# Patient Record
Sex: FEMALE | Race: WHITE | NOT HISPANIC OR LATINO | Employment: FULL TIME | URBAN - METROPOLITAN AREA
[De-identification: names, ages, dates, MRNs, and addresses within clinical notes are randomized per-mention and may not be internally consistent; named-entity substitution may affect disease eponyms.]

---

## 2020-07-30 ENCOUNTER — TELEPHONE (OUTPATIENT)
Dept: FAMILY MEDICINE CLINIC | Facility: CLINIC | Age: 25
End: 2020-07-30

## 2020-07-30 DIAGNOSIS — Z30.41 ENCOUNTER FOR SURVEILLANCE OF CONTRACEPTIVE PILLS: Primary | ICD-10-CM

## 2020-07-30 RX ORDER — NORGESTIMATE AND ETHINYL ESTRADIOL 7DAYSX3 28
1 KIT ORAL DAILY
COMMUNITY
End: 2020-07-30 | Stop reason: SDUPTHER

## 2020-07-30 RX ORDER — NORGESTIMATE AND ETHINYL ESTRADIOL 7DAYSX3 28
1 KIT ORAL DAILY
Qty: 28 TABLET | Refills: 0 | Status: SHIPPED | OUTPATIENT
Start: 2020-07-30 | End: 2020-08-24 | Stop reason: SDUPTHER

## 2020-07-30 NOTE — TELEPHONE ENCOUNTER
Needs a refill for tri-trevsem birth control    Pharmacy - [facility] [address]    Patient ph # [phone]

## 2020-08-24 ENCOUNTER — ANNUAL EXAM (OUTPATIENT)
Dept: OBGYN CLINIC | Facility: CLINIC | Age: 25
End: 2020-08-24
Payer: COMMERCIAL

## 2020-08-24 VITALS
TEMPERATURE: 97.1 F | SYSTOLIC BLOOD PRESSURE: 112 MMHG | WEIGHT: 125 LBS | BODY MASS INDEX: 20.83 KG/M2 | HEIGHT: 65 IN | DIASTOLIC BLOOD PRESSURE: 60 MMHG

## 2020-08-24 DIAGNOSIS — Z30.41 ENCOUNTER FOR SURVEILLANCE OF CONTRACEPTIVE PILLS: ICD-10-CM

## 2020-08-24 DIAGNOSIS — Z11.3 SCREENING EXAMINATION FOR VENEREAL DISEASE: ICD-10-CM

## 2020-08-24 DIAGNOSIS — Z01.419 ROUTINE GYNECOLOGICAL EXAMINATION: Primary | ICD-10-CM

## 2020-08-24 PROCEDURE — S0612 ANNUAL GYNECOLOGICAL EXAMINA: HCPCS | Performed by: OBSTETRICS & GYNECOLOGY

## 2020-08-24 PROCEDURE — G0145 SCR C/V CYTO,THINLAYER,RESCR: HCPCS | Performed by: OBSTETRICS & GYNECOLOGY

## 2020-08-24 PROCEDURE — 87491 CHLMYD TRACH DNA AMP PROBE: CPT | Performed by: OBSTETRICS & GYNECOLOGY

## 2020-08-24 PROCEDURE — 87591 N.GONORRHOEAE DNA AMP PROB: CPT | Performed by: OBSTETRICS & GYNECOLOGY

## 2020-08-24 RX ORDER — FLUTICASONE PROPIONATE 110 UG/1
2 AEROSOL, METERED RESPIRATORY (INHALATION) 2 TIMES DAILY
COMMUNITY

## 2020-08-24 RX ORDER — ALBUTEROL SULFATE 90 UG/1
2 AEROSOL, METERED RESPIRATORY (INHALATION) EVERY 6 HOURS PRN
COMMUNITY

## 2020-08-24 RX ORDER — NORGESTIMATE AND ETHINYL ESTRADIOL 7DAYSX3 28
1 KIT ORAL DAILY
Qty: 84 TABLET | Refills: 3 | Status: SHIPPED | OUTPATIENT
Start: 2020-08-24 | End: 2021-08-21

## 2020-08-24 NOTE — PROGRESS NOTES
Ector Solorzano is a 26 y o  female who presents for annual well woman exam  Periods are regular every 28-30 days, lasting 4 days  No intermenstrual bleeding, spotting, or discharge  Current contraception: OCP (estrogen/progesterone)  History of abnormal Pap smear: no  Family history of uterine or ovarian cancer: no  Regular self breast exam: yes    Family history of breast cancer: no    Menstrual History:  OB History        0    Para   0    Term   0       0    AB   0    Living   0       SAB   0    TAB   0    Ectopic   0    Multiple   0    Live Births   0                  Patient's last menstrual period was 2020 (within days)  The following portions of the patient's history were reviewed and updated as appropriate: allergies, current medications, past family history, past medical history, past social history, past surgical history and problem list     Review of Systems  Review of Systems   Constitutional: Negative for activity change, appetite change, chills, fatigue and fever  Respiratory: Negative for cough and shortness of breath  Cardiovascular: Negative for chest pain, palpitations and leg swelling  Gastrointestinal: Negative for abdominal pain, constipation, diarrhea, nausea and vomiting  Genitourinary: Negative for difficulty urinating, dysuria, flank pain, frequency, hematuria, urgency and vaginal discharge  Neurological: Negative for dizziness and headaches  Psychiatric/Behavioral: Negative for confusion            Objective      /60 (BP Location: Left arm, Patient Position: Sitting, Cuff Size: Adult)   Temp (!) 97 1 °F (36 2 °C) (Tympanic)   Ht 5' 5" (1 651 m)   Wt 56 7 kg (125 lb)   LMP 2020 (Within Days)   BMI 20 80 kg/m²     Physical Exam  OBGyn Exam     General:   alert and oriented, in no acute distress, alert, appears stated age and cooperative   Heart: regular rate and rhythm, S1, S2 normal, no murmur, click, rub or gallop Lungs: clear to auscultation bilaterally   Abdomen: soft, non-tender, without masses or organomegaly   Vulva: normal   Vagina: normal mucosa, normal discharge   Cervix: nabothian cyst, no bleeding following Pap and no cervical motion tenderness   Uterus: normal size   Adnexa:  Breast Exam:  normal adnexa and no mass, fullness, tenderness  breasts appear normal, no suspicious masses, no skin or nipple changes or axillary nodes  Assessment      @well woman@   81-year-old female  Annual exam  Asthma  OCP contraception desire to continue  Plan   Pap/reflux  GC/CT  Diet/exercise  Calcium/vitamin-D  Return to office for annual exam   All questions answered  There are no Patient Instructions on file for this visit

## 2020-08-25 LAB
C TRACH DNA SPEC QL NAA+PROBE: NEGATIVE
N GONORRHOEA DNA SPEC QL NAA+PROBE: NEGATIVE

## 2020-08-28 LAB
LAB AP GYN PRIMARY INTERPRETATION: NORMAL
LAB AP LMP: NORMAL
Lab: NORMAL

## 2021-02-03 ENCOUNTER — CLINICAL SUPPORT (OUTPATIENT)
Dept: FAMILY MEDICINE CLINIC | Facility: CLINIC | Age: 26
End: 2021-02-03
Payer: COMMERCIAL

## 2021-02-03 DIAGNOSIS — Z23 NEED FOR VACCINATION: Primary | ICD-10-CM

## 2021-02-03 PROCEDURE — 86580 TB INTRADERMAL TEST: CPT

## 2021-02-05 LAB
INDURATION: 0 MM
TB SKIN TEST: NEGATIVE

## 2021-03-01 ENCOUNTER — TELEMEDICINE (OUTPATIENT)
Dept: FAMILY MEDICINE CLINIC | Facility: CLINIC | Age: 26
End: 2021-03-01
Payer: COMMERCIAL

## 2021-03-01 VITALS — BODY MASS INDEX: 20.83 KG/M2 | WEIGHT: 125 LBS | HEIGHT: 65 IN

## 2021-03-01 DIAGNOSIS — I88.9 CERVICAL LYMPHADENITIS: Primary | ICD-10-CM

## 2021-03-01 PROCEDURE — 3008F BODY MASS INDEX DOCD: CPT | Performed by: FAMILY MEDICINE

## 2021-03-01 PROCEDURE — 1036F TOBACCO NON-USER: CPT | Performed by: FAMILY MEDICINE

## 2021-03-01 PROCEDURE — 99213 OFFICE O/P EST LOW 20 MIN: CPT | Performed by: FAMILY MEDICINE

## 2021-03-01 PROCEDURE — 3725F SCREEN DEPRESSION PERFORMED: CPT | Performed by: FAMILY MEDICINE

## 2021-03-01 RX ORDER — AMOXICILLIN 875 MG/1
875 TABLET, COATED ORAL 2 TIMES DAILY
Qty: 14 TABLET | Refills: 0 | Status: SHIPPED | OUTPATIENT
Start: 2021-03-01 | End: 2021-03-08

## 2021-03-01 NOTE — PROGRESS NOTES
Virtual Regular Visit      Assessment/Plan:    Problem List Items Addressed This Visit        Immune and Lymphatic    Cervical lymphadenitis - Primary     Pt has had swelling and tenderness below chin since last PM  Pt has also had low grade fever and swelling of b/l lower face  Will start abxs  Pt to call if sxs worsen or persist           Relevant Medications    amoxicillin (AMOXIL) 875 mg tablet               Reason for visit is   Chief Complaint   Patient presents with    sharp pain under chin, swelling in her face    Virtual Regular Visit        Encounter provider Werner Sanchez MD    Provider located at [address] [place] [id number] [phone]      Recent Visits  No visits were found meeting these conditions  Showing recent visits within past 7 days and meeting all other requirements     Today's Visits  Date Type Provider Dept   03/01/21 Telemedicine Werner Sanchez MD  [address] today's visits and meeting all other requirements     Future Appointments  No visits were found meeting these conditions  Showing future appointments within next 150 days and meeting all other requirements        The patient was identified by name and date of birth  Cata Cárdenas was informed that this is a telemedicine visit and that the visit is being conducted through [facility] and patient was informed that this is a secure, HIPAA-compliant platform  She agrees to proceed     My office door was closed  No one else was in the room  She acknowledged consent and understanding of privacy and security of the video platform  The patient has agreed to participate and understands they can discontinue the visit at any time  Patient is aware this is a billable service  Subjective  Cata Cárdenas is a 26 y o  female    Pt with tenderness under chin since last PM  Feels swollen under chin on R side  Had low grade fever   No cough, sore throat, or sinus congestion  Hurts to chew and feels swollen on lower face b/l  Has mild HA  Pain is        Past Medical History:   Diagnosis Date    Anxiety     Asthma        Past Surgical History:   Procedure Laterality Date    TONSILLECTOMY AND ADENOIDECTOMY      WISDOM TOOTH EXTRACTION         Current Outpatient Medications   Medication Sig Dispense Refill    albuterol (PROVENTIL HFA,VENTOLIN HFA) 90 mcg/act inhaler Inhale 2 puffs every 6 (six) hours as needed for wheezing      fluticasone (FLOVENT HFA) 110 MCG/ACT inhaler Inhale 2 puffs 2 (two) times a day Rinse mouth after use   norgestimate-ethinyl estradiol (ORTHO TRI-CYCLEN,TRINESSA) 0 18/0 215/0 25 MG-35 MCG per tablet Take 1 tablet by mouth daily 84 tablet 3    amoxicillin (AMOXIL) 875 mg tablet Take 1 tablet (875 mg total) by mouth 2 (two) times a day for 7 days 14 tablet 0     No current facility-administered medications for this visit  No Known Allergies    Review of Systems   Constitutional: Positive for fever  Negative for chills and fatigue  Unexpected weight change: low grade  HENT: Negative for congestion, ear pain, postnasal drip, rhinorrhea, sinus pressure, sinus pain, sore throat and trouble swallowing  Tenderness and swelling below chin   Respiratory: Negative for cough, chest tightness, shortness of breath and wheezing  Cardiovascular: Negative for chest pain  Gastrointestinal: Negative for abdominal pain, diarrhea, nausea and vomiting  Genitourinary: Negative for difficulty urinating  Musculoskeletal: Negative for arthralgias  Skin: Negative for rash  Neurological: Positive for headaches  Negative for dizziness  Video Exam    Vitals:    03/01/21 1635   Weight: 56 7 kg (125 lb)   Height: 5' 5" (1 651 m)       Physical Exam  Vitals signs and nursing note reviewed  Constitutional:       Appearance: She is well-developed  HENT:      Head: Normocephalic and atraumatic        Nose: No congestion  Mouth/Throat:      Pharynx: No posterior oropharyngeal erythema  Cardiovascular:      Rate and Rhythm: Normal rate  Pulmonary:      Effort: Pulmonary effort is normal  No respiratory distress  Breath sounds: No wheezing  Lymphadenopathy:      Cervical: Cervical adenopathy present  Neurological:      Mental Status: She is alert and oriented to person, place, and time  Psychiatric:         Behavior: Behavior normal          Thought Content: Thought content normal          Judgment: Judgment normal           I spent 15 minutes directly with the patient during this visit      [address]edillo acknowledges that she has consented to an online visit or consultation  She understands that the online visit is based solely on information provided by her, and that, in the absence of a face-to-face physical evaluation by the physician, the diagnosis she receives is both limited and provisional in terms of accuracy and completeness  This is not intended to replace a full medical face-to-face evaluation by the physician  Balbina Betancourt understands and accepts these terms

## 2021-03-01 NOTE — ASSESSMENT & PLAN NOTE
Pt has had swelling and tenderness below chin since last PM  Pt has also had low grade fever and swelling of b/l lower face  Will start abxs  Pt to call if sxs worsen or persist

## 2021-04-09 DIAGNOSIS — Z23 ENCOUNTER FOR IMMUNIZATION: ICD-10-CM

## 2021-11-01 ENCOUNTER — TELEPHONE (OUTPATIENT)
Dept: OBGYN CLINIC | Facility: CLINIC | Age: 26
End: 2021-11-01

## 2021-11-01 DIAGNOSIS — Z30.41 ENCOUNTER FOR SURVEILLANCE OF CONTRACEPTIVE PILLS: ICD-10-CM

## 2021-11-01 RX ORDER — NORGESTIMATE AND ETHINYL ESTRADIOL 7DAYSX3 28
1 KIT ORAL DAILY
Qty: 84 TABLET | Refills: 1 | Status: SHIPPED | OUTPATIENT
Start: 2021-11-01

## 2023-05-01 ENCOUNTER — TELEPHONE (OUTPATIENT)
Dept: INTERNAL MEDICINE CLINIC | Facility: CLINIC | Age: 28
End: 2023-05-01

## 2023-05-01 NOTE — TELEPHONE ENCOUNTER
Patient called back , she says she lives outside of area and she will find a new pcp.to get the referral to the endocrinologist

## 2023-05-04 NOTE — TELEPHONE ENCOUNTER
"Elba - Can you look for these records for Dr BELLE  I think your [employer] records are in the \"Q\" drive - I unfortunately do not have access to your offices [address]  I did try to look her up    "
Emailed records release to patient
LM on  to call and schedule apt
Message for patient was left this am at 901am  Patient needs an appointment with Dr Bert Nolan as it has been some time since she has been in to see him  Requesting her to schedule appointment 
Patient called returning [employer], stating in the telephone encounter the patient will need an appointment with Agatha Lopez since she has been seen  Patient stated she moved and its not in the area and she wants to establish care with a new doctor but is requesting medical records, I told the patient will need release form sheet filled  Patient preferred e-mail and will fax over back to the office for her medical records 
Pt claims she is a patient of Dr Kye Andrew - has not been seen since he became [facility] so maybe records are still in your EMR, [address] or  paper chart    She is asking if Dr BELLE can give her a new referral to an Endocrinologist   He gave her one before but she never went and now wants to go  Can you let her know?
Endorses hx of SI and stated "I don't know" when asked about past SA